# Patient Record
Sex: FEMALE | Race: AMERICAN INDIAN OR ALASKA NATIVE
[De-identification: names, ages, dates, MRNs, and addresses within clinical notes are randomized per-mention and may not be internally consistent; named-entity substitution may affect disease eponyms.]

---

## 2019-12-27 ENCOUNTER — HOSPITAL ENCOUNTER (OUTPATIENT)
Dept: HOSPITAL 5 - CATHLABREC | Age: 64
Setting detail: OBSERVATION
LOS: 1 days | Discharge: HOME | End: 2019-12-28
Attending: RADIOLOGY | Admitting: RADIOLOGY
Payer: COMMERCIAL

## 2019-12-27 DIAGNOSIS — Z79.4: ICD-10-CM

## 2019-12-27 DIAGNOSIS — I73.9: Primary | ICD-10-CM

## 2019-12-27 PROCEDURE — 96376 TX/PRO/DX INJ SAME DRUG ADON: CPT

## 2019-12-27 PROCEDURE — 37252 INTRVASC US NONCORONARY 1ST: CPT

## 2019-12-27 PROCEDURE — C1769 GUIDE WIRE: HCPCS

## 2019-12-27 PROCEDURE — C1887 CATHETER, GUIDING: HCPCS

## 2019-12-27 PROCEDURE — 96374 THER/PROPH/DIAG INJ IV PUSH: CPT

## 2019-12-27 PROCEDURE — 96372 THER/PROPH/DIAG INJ SC/IM: CPT

## 2019-12-27 PROCEDURE — 96361 HYDRATE IV INFUSION ADD-ON: CPT

## 2019-12-27 PROCEDURE — 37224: CPT

## 2019-12-27 PROCEDURE — 85347 COAGULATION TIME ACTIVATED: CPT

## 2019-12-27 PROCEDURE — 82962 GLUCOSE BLOOD TEST: CPT

## 2019-12-27 PROCEDURE — 96375 TX/PRO/DX INJ NEW DRUG ADDON: CPT

## 2019-12-27 PROCEDURE — C2623 CATH, TRANSLUMIN, DRUG-COAT: HCPCS

## 2019-12-27 PROCEDURE — 96360 HYDRATION IV INFUSION INIT: CPT

## 2019-12-27 PROCEDURE — 76937 US GUIDE VASCULAR ACCESS: CPT

## 2019-12-27 PROCEDURE — C1757 CATH, THROMBECTOMY/EMBOLECT: HCPCS

## 2019-12-27 PROCEDURE — 37184 PRIM ART M-THRMBC 1ST VSL: CPT

## 2019-12-27 PROCEDURE — C1753 CATH, INTRAVAS ULTRASOUND: HCPCS

## 2019-12-27 PROCEDURE — C1725 CATH, TRANSLUMIN NON-LASER: HCPCS

## 2019-12-27 PROCEDURE — C1884 EMBOLIZATION PROTECT SYST: HCPCS

## 2019-12-27 PROCEDURE — 75716 ARTERY X-RAYS ARMS/LEGS: CPT

## 2019-12-27 PROCEDURE — G0378 HOSPITAL OBSERVATION PER HR: HCPCS

## 2019-12-27 RX ADMIN — ALTEPLASE ONE MG: 2.2 INJECTION, POWDER, LYOPHILIZED, FOR SOLUTION INTRAVENOUS at 16:40

## 2019-12-27 RX ADMIN — INSULIN HUMAN SCH UNITS: 100 INJECTION, SOLUTION PARENTERAL at 21:32

## 2019-12-27 RX ADMIN — LIDOCAINE HYDROCHLORIDE,EPINEPHRINE BITARTRATE ONE ML: 10; .01 INJECTION, SOLUTION INFILTRATION; PERINEURAL at 13:41

## 2019-12-27 RX ADMIN — SODIUM CHLORIDE SCH MLS/HR: 0.9 INJECTION, SOLUTION INTRAVENOUS at 18:43

## 2019-12-27 RX ADMIN — LIDOCAINE HYDROCHLORIDE,EPINEPHRINE BITARTRATE ONE ML: 10; .01 INJECTION, SOLUTION INFILTRATION; PERINEURAL at 13:38

## 2019-12-27 RX ADMIN — HEPARIN SODIUM ONE UNIT: 1000 INJECTION, SOLUTION INTRAVENOUS; SUBCUTANEOUS at 14:30

## 2019-12-27 RX ADMIN — HEPARIN SODIUM ONE UNIT: 1000 INJECTION, SOLUTION INTRAVENOUS; SUBCUTANEOUS at 16:00

## 2019-12-27 RX ADMIN — HYDRALAZINE HYDROCHLORIDE PRN MG: 20 INJECTION INTRAMUSCULAR; INTRAVENOUS at 18:20

## 2019-12-27 RX ADMIN — CEFAZOLIN ONE MLS: 330 INJECTION, POWDER, FOR SOLUTION INTRAMUSCULAR; INTRAVENOUS at 13:36

## 2019-12-27 RX ADMIN — HYDRALAZINE HYDROCHLORIDE PRN MG: 20 INJECTION INTRAMUSCULAR; INTRAVENOUS at 19:04

## 2019-12-27 RX ADMIN — ALTEPLASE ONE MG: 2.2 INJECTION, POWDER, LYOPHILIZED, FOR SOLUTION INTRAVENOUS at 16:12

## 2019-12-27 RX ADMIN — CEFAZOLIN ONE MLS: 330 INJECTION, POWDER, FOR SOLUTION INTRAMUSCULAR; INTRAVENOUS at 13:38

## 2019-12-27 RX ADMIN — SODIUM CHLORIDE SCH MLS/HR: 0.9 INJECTION, SOLUTION INTRAVENOUS at 09:20

## 2019-12-27 RX ADMIN — HEPARIN SODIUM ONE UNIT: 1000 INJECTION, SOLUTION INTRAVENOUS; SUBCUTANEOUS at 15:15

## 2019-12-27 NOTE — ANESTHESIA CONSULTATION
Anesthesia Consult and Med Hx


Date of service: 12/27/19





- Airway


Anesthetic Teeth Evaluation: Dentures, Edentulous


ROM Head & Neck: Adequate


Mental/Hyoid Distance: Adequate


Mallampati Class: Class II


Intubation Access Assessment: Probably Good





- Pre-Operative Health Status


ASA Pre-Surgery Classification: ASA3


Proposed Anesthetic Plan: MAC





- Pulmonary


Hx Smoking: Yes





- Cardiovascular System


Hx Hypertension: Yes


Hx Peripheral Vascular Disease: Yes





- Central Nervous System


Hx Psychiatric Problems: No





- Gastrointestinal


Hx Gastroesophageal Reflux Disease: Yes





- Endocrine


Hx Renal Disease: Yes


Hx Non-Insulin Dependent Diabetes: Yes





- Hematic


Hx Anemia: Yes





- Other Systems


Hx Cancer: No

## 2019-12-27 NOTE — PROGRESS NOTE
Assessment and Plan





1. S/p revascularization of left SFA and popliteal artery stents


2. Observation overnight for discharge in the morning.





Subjective


Date of service: 12/27/19


Principal diagnosis: Left foot PAD with 2nd and 5th toe ulcer with gangrene


Interval history: 





Patient has active left 2nd and 5th toe ulceration. Has had previous left SFA 

and popliteal stents placed 1 year ago. Has had interval oclusion of portion of 

her left stents. Perfomed revascularization procedure with thrombectomy and 

balloon angioplasty





Objective





- Constitutional


Vitals: 


                               Vital Signs - 12hr











  12/27/19





  09:47


 


Temperature 98.7 F


 


Pulse Rate 97 H


 


Respiratory 14





Rate 


 


Blood Pressure 160/88





[Left] 


 


O2 Sat by Pulse 92





Oximetry 











General appearance: Present: no acute distress





- EENT


Eyes: PERRL


ENT: hearing intact





- Respiratory


Respiratory effort: normal





- Cardiovascular


Rhythm: regular


Extremity abnormal: other (Dopplerable left DP and PT. Ulceration in 2nd toe 

with recent graft. Previous great toe amputation. Left 5th toe dry gangrene)





- Labs


Labs: 


                              Abnormal lab results











  12/27/19 12/27/19 12/27/19 Range/Units





  10:07 12:21 13:20 


 


POC Glucose  228 H  211 H  168 H  ()  














Medications & Allergies





- Medications


Allergies/Adverse Reactions: 


                                    Allergies





No Known Allergies Allergy (Verified 12/27/19 08:57)


   








Home Medications: 


                                Home Medications











 Medication  Instructions  Recorded  Confirmed  Last Taken  Type


 


Clopidogrel [Plavix] 75 mg PO DAILY 12/27/19 12/27/19 12/26/19 History





    75 mg 


 


Gabapentin 300 mg PO TID 12/27/19 12/27/19 12/26/19 History





    300 mg 


 


Insulin Glargine [Lantus VIAL] 40 units SC QHS 12/27/19 12/27/19 12/26/19 

History





    40 units 


 


Lispro Insulin [HumaLOG] 12 units SC DAILY PRN 12/27/19 12/27/19 12/26/19 

History





    12 units 


 


cloNIDine [Catapres] 0.1 mg PO TID 12/27/19 12/27/19 12/26/19 History





    0.1mg 


 


tiZANidine [Zanaflex 4mg TAB] 4 mg PO BID PRN 12/27/19 12/27/19 12/26/19 History





    4 mg 











Active Medications: 














Generic Name Dose Route Start Last Admin





  Trade Name Freq  PRN Reason Stop Dose Admin


 


Sodium Chloride  1,000 mls @ 100 mls/hr  12/27/19 09:00  12/27/19 09:20





  Nacl 0.9% 1000 Ml  IV   100 mls/hr





  AS DIRECT MARAH   Administration


 


Insulin Human Regular  0 units  12/27/19 22:00 





  Humulin R  SUB-Q  





  ACHS MARAH  





  Protocol  


 


Morphine Sulfate  2 mg  12/27/19 18:03 





  Morphine  IV  





  Q6H PRN  





  Pain, Moderate (4-6)  


 


Ondansetron HCl  4 mg  12/27/19 18:04 





  Zofran  IV  





  Q6H PRN  





  Nausea And Vomiting

## 2019-12-28 VITALS — DIASTOLIC BLOOD PRESSURE: 20 MMHG | SYSTOLIC BLOOD PRESSURE: 137 MMHG

## 2019-12-28 RX ADMIN — SODIUM CHLORIDE SCH MLS/HR: 0.9 INJECTION, SOLUTION INTRAVENOUS at 06:07

## 2019-12-28 RX ADMIN — INSULIN HUMAN SCH: 100 INJECTION, SOLUTION PARENTERAL at 09:27

## 2019-12-28 NOTE — PROGRESS NOTE
Assessment and Plan





1. D/C to home


2. Patient to continue with home meds and plavix 75 mg daily along with 81 mg 

aspirin


3. F/U with podiatrist on Monday


4. My office will follow up with patient to schedule follow up office visit in 2

weeks.





Subjective


Date of service: 12/28/19


Principal diagnosis: Left foot PAD with 2nd and 5th toe ulcer with gangrene


Interval history: 





s/p day 1 after revascularization procedure of left lower extremity 





Objective





- Exam


Narrative Exam: 





Patient without any new complaints. Resting comfortably. No pain. 





- Constitutional


Vitals: 


                               Vital Signs - 12hr











  12/27/19 12/27/19 12/27/19





  19:30 20:00 20:43


 


Temperature   98.0 F


 


Pulse Rate 96 H 97 H 101 H


 


Respiratory 16 11 L 18





Rate   


 


Blood Pressure 186/105 187/105 198/95


 


O2 Sat by Pulse 100 100 100





Oximetry   














  12/27/19 12/27/19 12/28/19





  20:45 21:31 00:17


 


Temperature   98.2 F


 


Pulse Rate 104 H 90 104 H


 


Respiratory   18





Rate   


 


Blood Pressure  198/85 137/70


 


O2 Sat by Pulse   99





Oximetry   














  12/28/19





  03:24


 


Temperature 


 


Pulse Rate 102 H


 


Respiratory 





Rate 


 


Blood Pressure 


 


O2 Sat by Pulse 





Oximetry 











General appearance: Present: no acute distress





- EENT


Eyes: PERRL


ENT: hearing intact





- Respiratory


Respiratory effort: normal





- Cardiovascular


Rhythm: regular


Extremity abnormal: other (Left foot is warm. Left dorsalis pedis is palpable 

1+. The remainder of the foot is unchanged. Right groin access site is clean and

dry. No hematoma. Femoral pulse is 2+)





- Gastrointestinal


General gastrointestinal: Present: soft, non-tender





- Labs


Labs: 


                              Abnormal lab results











  12/27/19 12/27/19 12/27/19 Range/Units





  10:07 12:21 13:20 


 


POC Glucose  228 H  211 H  168 H  ()  














  12/27/19 12/27/19 Range/Units





  19:57 20:57 


 


POC Glucose  202 H  236 H  ()  














Medications & Allergies





- Medications


Allergies/Adverse Reactions: 


                                    Allergies





No Known Allergies Allergy (Verified 12/27/19 08:57)


   








Home Medications: 


                                Home Medications











 Medication  Instructions  Recorded  Confirmed  Last Taken  Type


 


Clopidogrel [Plavix] 75 mg PO DAILY 12/27/19 12/27/19 12/26/19 History





    75 mg 


 


Gabapentin 300 mg PO TID 12/27/19 12/27/19 12/26/19 History





    300 mg 


 


Insulin Glargine [Lantus VIAL] 40 units SC QHS 12/27/19 12/27/19 12/26/19 

History





    40 units 


 


Lispro Insulin [HumaLOG] 12 units SC DAILY PRN 12/27/19 12/27/19 12/26/19 

History





    12 units 


 


cloNIDine [Catapres] 0.1 mg PO TID 12/27/19 12/27/19 12/26/19 History





    0.1mg 


 


tiZANidine [Zanaflex 4mg TAB] 4 mg PO BID PRN 12/27/19 12/27/19 12/26/19 History





    4 mg 











Active Medications: 














Generic Name Dose Route Start Last Admin





  Trade Name Freq  PRN Reason Stop Dose Admin


 


Clonidine HCl  0.1 mg  12/27/19 22:00  12/27/19 21:31





  Catapres  PO   0.1 mg





  Q12HR MARAH   Administration


 


Hydralazine HCl  5 mg  12/27/19 18:12  12/27/19 19:04





  Apresoline  IV   5 mg





  Q30MIN PRN   Administration





  Agitation  


 


Sodium Chloride  1,000 mls @ 100 mls/hr  12/27/19 09:00  12/28/19 06:07





  Nacl 0.9% 1000 Ml  IV   100 mls/hr





  AS DIRECT MARAH   Administration


 


Insulin Human Regular  0 units  12/27/19 22:00  12/27/19 21:32





  Humulin R  SUB-Q   3 units





  ACHS MARAH   Administration





  Protocol  


 


Morphine Sulfate  2 mg  12/27/19 18:03  12/27/19 21:36





  Morphine  IV   2 mg





  Q6H PRN   Administration





  Pain, Moderate (4-6)  


 


Ondansetron HCl  4 mg  12/27/19 18:04 





  Zofran  IV  





  Q6H PRN  





  Nausea And Vomiting

## 2020-01-06 NOTE — OPERATIVE REPORT
PROCEDURE:  Left lower extremity arteriogram with secondary thrombectomy as well

as angioplasty of the left SFA and popliteal artery and angioplasty of the left

anterior tibial artery.



MEDICATIONS USED:  7000 units of heparin intravenous, 400 mcg of nitroglycerin

intra-arterial, lidocaine 1% 2 mL local.



ANESTHESIA:  MAC provided by anesthesia service.



CONTRAST:  28 mL of Visipaque.



INDICATION:  Left fourth toe gangrene and second toe ulcer.



DESCRIPTION OF PROCEDURE:  The patient has had previous left SFA, left

femoropopliteal stent placement.  On outside ultrasound, there was evidence for

possible occlusion of a portion of the left SFA.  Due to mild renal

insufficiency, the patient was brought in for hydration with normal saline 4

hours prior to the procedure.  Following informed and written consent, the

patient was placed supine on the angiographic table and the right groin was

prepped and draped in the usual sterile fashion.  Lidocaine was used for local

anesthetic and then a micropuncture access site using ultrasound guidance was

utilized to access the right common femoral artery and a 5-[place] vascular

sheath was placed.  A Glidewire was advanced into the abdominal aorta and a rim

catheter was utilized to cross over to the contralateral side.  Glidewire was

advanced into the SFA and then the rim was exchanged for a vertebral catheter. 

Initial images of the left lower extremity was obtained using CO2 angiography

and then the catheter was advanced into the mid SFA and selective contrast

images were obtained to the foot.  A stiff Glidewire was advanced into the SFA

through the vertebral catheter.  The catheter was then removed and then a

7-[place] crossover sheath was advanced into the left common femoral artery. 

With aid of the vertebral artery, the Glidewire was negotiated into the distal

popliteal artery, and then at this point, a 6 mm Spider filter was positioned

within the popliteal artery.  An IVUS catheter was advanced into the popliteal

artery and pullback images were obtained to the common femoral artery.  A 6 mm

cutting balloon was utilized to dilate multiple areas throughout the existing

stent as well as the native popliteal artery.  Then two 6 x 150 mm drug-coated

balloons were utilized to dilate the femoropopliteal system.  Post-dilatation

DSA images were obtained and based on subsequent results, I elected to utilize

the AngioJet system to remove residual thrombus.  Initially, pulse spray

technique with 10 mg of TPA was performed, which was allowed to dwell for 30

minutes prior to removal using the AngioJet system.  Following utilization of

the AngioJet, the femoropopliteal system was once again dilated with a 6 mm

balloon.  At this point, the filter wire was recaptured and removed and DSA

images were obtained.  A 0.014 inch guidewire was advanced into the anterior

tibial artery and the vertebral artery advanced to the origin of the anterior

tibial artery and nitroglycerin was infused on 2 separate instances giving 200

mcg at a time.  The proximal anterior tibial artery was dilated using a 3 mm

balloon and post-angioplasty images were obtained from the proximal SFA to the

foot.  At this point, the procedure was terminated and the sheath was removed

and manual compression applied until adequate hemostasis was achieved.  The

patient tolerated the procedure well and taken to the recovery area in

satisfactory condition and subsequently kept for 23-hour observation.



FINDINGS:  There was no significant inflow stenosis present.  There was evidence

for occlusion of portion of the existing superficial femoral artery stent with

reconstitution of flow in the mid to distal stent.  The patient had known

occlusion of the posterior tibial artery.  The peroneal artery is patent with

providing collateral flow to the portion of the posterior tibial artery

distribution.  The anterior tibial artery is the main runoff and is continuous

to the dorsalis pedis, which provides majority of the flow into the foot.  There

is overall limited digital flow.  There is mild-to-moderate stenosis involving

the proximal anterior tibial artery.  IVUS evaluation demonstrated extensive

mixed plaque and thrombus throughout the existing stent as well as greater than

50% stenosis in portion of the popliteal artery.  Following balloon angioplasty

with AngioJet of the femoropopliteal system as well as the anterior tibial

artery, there is good angiographic result with no significant stenosis and

inline flow established throughout to the foot via the femoropopliteal artery

and anterior tibial artery.



IMPRESSION

1.  Occlusion of the existing stent in the femoropopliteal artery.

2.  Successful angioplasty with thrombectomy as described above.

3.  Established inline flow via the anterior tibial artery as the main runoff.





DD: 01/06/2020 13:42

DT: 01/06/2020 14:54

JOB# [id number]  [id number]

SKS/JEFFERSON

## 2023-03-12 ENCOUNTER — OUT OF OFFICE VISIT (OUTPATIENT)
Dept: URBAN - METROPOLITAN AREA MEDICAL CENTER 16 | Facility: MEDICAL CENTER | Age: 68
End: 2023-03-12
Payer: MEDICARE

## 2023-03-12 DIAGNOSIS — K92.1 BLACK STOOL: ICD-10-CM

## 2023-03-12 DIAGNOSIS — D12.3 ADENOMA OF TRANSVERSE COLON: ICD-10-CM

## 2023-03-12 DIAGNOSIS — D64.89 ANEMIA DUE TO OTHER CAUSE: ICD-10-CM

## 2023-03-12 DIAGNOSIS — N18.6 ANEMIA DUE TO CHRONIC KIDNEY DISEASE, ON CHRONIC DIALYSIS: ICD-10-CM

## 2023-03-12 DIAGNOSIS — R19.5 ABNORMAL CONSISTENCY OF STOOL: ICD-10-CM

## 2023-03-12 DIAGNOSIS — Z79.02 ANTIPLATELET OR ANTITHROMBOTIC LONG-TERM USE: ICD-10-CM

## 2023-03-12 PROCEDURE — G8427 DOCREV CUR MEDS BY ELIG CLIN: HCPCS | Performed by: INTERNAL MEDICINE

## 2023-03-12 PROCEDURE — 45385 COLONOSCOPY W/LESION REMOVAL: CPT | Performed by: INTERNAL MEDICINE

## 2023-03-12 PROCEDURE — 99222 1ST HOSP IP/OBS MODERATE 55: CPT | Performed by: INTERNAL MEDICINE

## 2023-03-12 PROCEDURE — 43235 EGD DIAGNOSTIC BRUSH WASH: CPT | Performed by: INTERNAL MEDICINE

## 2023-03-13 ENCOUNTER — OUT OF OFFICE VISIT (OUTPATIENT)
Dept: URBAN - METROPOLITAN AREA MEDICAL CENTER 16 | Facility: MEDICAL CENTER | Age: 68
End: 2023-03-13
Payer: MEDICARE

## 2023-03-13 DIAGNOSIS — N18.2 CHRONIC KIDNEY DISEASE (CKD) STAGE G2/A3, MILDLY DECREASED GLOMERULAR FILTRATION RATE (GFR) BETWEEN 60-89 ML/MIN/1.73 SQUARE METER AND ALBUMINURIA CREATININE RATIO GREATER THAN 300 MG/G: ICD-10-CM

## 2023-03-13 DIAGNOSIS — D64.89 ANEMIA DUE TO OTHER CAUSE: ICD-10-CM

## 2023-03-13 DIAGNOSIS — R19.5 ABNORMAL CONSISTENCY OF STOOL: ICD-10-CM

## 2023-03-13 DIAGNOSIS — Z99.2 CHRONIC IN-CENTER HEMODIALYSIS STATUS: ICD-10-CM

## 2023-03-13 PROCEDURE — 99232 SBSQ HOSP IP/OBS MODERATE 35: CPT
